# Patient Record
Sex: FEMALE | Race: BLACK OR AFRICAN AMERICAN | Employment: UNEMPLOYED | URBAN - METROPOLITAN AREA
[De-identification: names, ages, dates, MRNs, and addresses within clinical notes are randomized per-mention and may not be internally consistent; named-entity substitution may affect disease eponyms.]

---
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
## 2021-09-10 ENCOUNTER — HOSPITAL ENCOUNTER (OUTPATIENT)
Age: 21
Discharge: HOME | End: 2021-09-10
Payer: MEDICARE

## 2021-09-10 ENCOUNTER — HOSPITAL ENCOUNTER (OUTPATIENT)
Age: 21
Discharge: HOME | End: 2021-09-12
Payer: MEDICARE

## 2021-09-10 ENCOUNTER — HOSPITAL ENCOUNTER (OUTPATIENT)
Dept: GENERAL RADIOLOGY | Age: 21
Discharge: HOME | End: 2021-09-12
Payer: MEDICARE

## 2021-09-10 DIAGNOSIS — R63.4 LOSS OF WEIGHT: ICD-10-CM

## 2021-09-10 LAB
-: ABNORMAL
ABSOLUTE EOS #: 0.14 K/UL (ref 0–0.44)
ABSOLUTE IMMATURE GRANULOCYTE: <0.03 K/UL (ref 0–0.3)
ABSOLUTE LYMPH #: 1.74 K/UL (ref 1.2–5.2)
ABSOLUTE MONO #: 0.39 K/UL (ref 0.1–1.4)
ALBUMIN SERPL-MCNC: 4.3 G/DL (ref 3.5–5.2)
ALBUMIN/GLOBULIN RATIO: 1.4 (ref 1–2.5)
ALP BLD-CCNC: 60 U/L (ref 35–104)
ALT SERPL-CCNC: 8 U/L (ref 5–33)
AMORPHOUS: ABNORMAL
ANION GAP SERPL CALCULATED.3IONS-SCNC: 11 MMOL/L (ref 9–17)
AST SERPL-CCNC: 10 U/L
BACTERIA: ABNORMAL
BASOPHILS # BLD: 1 % (ref 0–2)
BASOPHILS ABSOLUTE: 0.04 K/UL (ref 0–0.2)
BILIRUB SERPL-MCNC: 0.49 MG/DL (ref 0.3–1.2)
BILIRUBIN URINE: ABNORMAL
BUN BLDV-MCNC: 6 MG/DL (ref 6–20)
BUN/CREAT BLD: ABNORMAL (ref 9–20)
CALCIUM SERPL-MCNC: 9.2 MG/DL (ref 8.6–10.4)
CASTS UA: ABNORMAL /LPF (ref 0–2)
CHLORIDE BLD-SCNC: 105 MMOL/L (ref 98–107)
CHOLESTEROL/HDL RATIO: 2.4
CHOLESTEROL: 125 MG/DL
CO2: 23 MMOL/L (ref 20–31)
COLOR: ABNORMAL
COMMENT UA: ABNORMAL
CREAT SERPL-MCNC: 0.76 MG/DL (ref 0.5–0.9)
CRYSTALS, UA: ABNORMAL /HPF
DIFFERENTIAL TYPE: NORMAL
EOSINOPHILS RELATIVE PERCENT: 2 % (ref 1–4)
EPITHELIAL CELLS UA: ABNORMAL /HPF (ref 0–5)
ESTIMATED AVERAGE GLUCOSE: 108 MG/DL
GFR AFRICAN AMERICAN: >60 ML/MIN
GFR NON-AFRICAN AMERICAN: >60 ML/MIN
GFR SERPL CREATININE-BSD FRML MDRD: ABNORMAL ML/MIN/{1.73_M2}
GFR SERPL CREATININE-BSD FRML MDRD: ABNORMAL ML/MIN/{1.73_M2}
GLUCOSE BLD-MCNC: 103 MG/DL (ref 70–99)
GLUCOSE URINE: NEGATIVE
HBA1C MFR BLD: 5.4 % (ref 4–6)
HCT VFR BLD CALC: 44.1 % (ref 36.3–47.1)
HDLC SERPL-MCNC: 52 MG/DL
HEMOGLOBIN: 14.3 G/DL (ref 11.9–15.1)
IMMATURE GRANULOCYTES: 0 %
KETONES, URINE: ABNORMAL
LDL CHOLESTEROL: 62 MG/DL (ref 0–130)
LEUKOCYTE ESTERASE, URINE: ABNORMAL
LYMPHOCYTES # BLD: 30 % (ref 25–45)
MCH RBC QN AUTO: 30.1 PG (ref 25.2–33.5)
MCHC RBC AUTO-ENTMCNC: 32.4 G/DL (ref 28.4–34.8)
MCV RBC AUTO: 92.8 FL (ref 82.6–102.9)
MONOCYTES # BLD: 7 % (ref 2–8)
MUCUS: ABNORMAL
NITRITE, URINE: NEGATIVE
NRBC AUTOMATED: 0 PER 100 WBC
OTHER OBSERVATIONS UA: ABNORMAL
PDW BLD-RTO: 11.9 % (ref 11.8–14.4)
PH UA: 6 (ref 5–8)
PLATELET # BLD: 278 K/UL (ref 138–453)
PLATELET ESTIMATE: NORMAL
PMV BLD AUTO: 9.8 FL (ref 8.1–13.5)
POTASSIUM SERPL-SCNC: 3.6 MMOL/L (ref 3.7–5.3)
PROTEIN UA: ABNORMAL
RBC # BLD: 4.75 M/UL (ref 3.95–5.11)
RBC # BLD: NORMAL 10*6/UL
RBC UA: ABNORMAL /HPF (ref 0–2)
RENAL EPITHELIAL, UA: ABNORMAL /HPF
SEG NEUTROPHILS: 60 % (ref 34–64)
SEGMENTED NEUTROPHILS ABSOLUTE COUNT: 3.5 K/UL (ref 1.8–8)
SODIUM BLD-SCNC: 139 MMOL/L (ref 135–144)
SPECIFIC GRAVITY UA: 1.03 (ref 1–1.03)
TOTAL PROTEIN: 7.3 G/DL (ref 6.4–8.3)
TRICHOMONAS: ABNORMAL
TRIGL SERPL-MCNC: 54 MG/DL
TSH SERPL DL<=0.05 MIU/L-ACNC: 1.51 MIU/L (ref 0.3–5)
TURBIDITY: ABNORMAL
URINE HGB: NEGATIVE
UROBILINOGEN, URINE: NORMAL
VLDLC SERPL CALC-MCNC: NORMAL MG/DL (ref 1–30)
WBC # BLD: 5.8 K/UL (ref 4.5–13.5)
WBC # BLD: NORMAL 10*3/UL
WBC UA: ABNORMAL /HPF (ref 0–5)
YEAST: ABNORMAL

## 2021-09-10 PROCEDURE — 84443 ASSAY THYROID STIM HORMONE: CPT

## 2021-09-10 PROCEDURE — 81001 URINALYSIS AUTO W/SCOPE: CPT

## 2021-09-10 PROCEDURE — 80061 LIPID PANEL: CPT

## 2021-09-10 PROCEDURE — 80053 COMPREHEN METABOLIC PANEL: CPT

## 2021-09-10 PROCEDURE — 36415 COLL VENOUS BLD VENIPUNCTURE: CPT

## 2021-09-10 PROCEDURE — 83036 HEMOGLOBIN GLYCOSYLATED A1C: CPT

## 2021-09-10 PROCEDURE — 71046 X-RAY EXAM CHEST 2 VIEWS: CPT

## 2021-09-10 PROCEDURE — 85025 COMPLETE CBC W/AUTO DIFF WBC: CPT

## 2023-08-18 ENCOUNTER — HOSPITAL ENCOUNTER (EMERGENCY)
Age: 23
Discharge: HOME | End: 2023-08-18
Attending: EMERGENCY MEDICINE
Payer: MEDICAID

## 2023-08-18 VITALS
TEMPERATURE: 97.9 F | SYSTOLIC BLOOD PRESSURE: 101 MMHG | WEIGHT: 115 LBS | DIASTOLIC BLOOD PRESSURE: 68 MMHG | BODY MASS INDEX: 18.05 KG/M2 | HEART RATE: 78 BPM | HEIGHT: 67 IN | RESPIRATION RATE: 16 BRPM | OXYGEN SATURATION: 100 %

## 2023-08-18 DIAGNOSIS — K08.89 PAIN, DENTAL: Primary | ICD-10-CM

## 2023-08-18 PROCEDURE — 99283 EMERGENCY DEPT VISIT LOW MDM: CPT

## 2023-08-18 PROCEDURE — 6370000000 HC RX 637 (ALT 250 FOR IP)

## 2023-08-18 RX ORDER — ACETAMINOPHEN 325 MG/1
650 TABLET ORAL EVERY 6 HOURS PRN
Qty: 28 TABLET | Refills: 0 | Status: SHIPPED | OUTPATIENT
Start: 2023-08-18 | End: 2023-08-25

## 2023-08-18 RX ORDER — PENICILLIN V POTASSIUM 500 MG/1
500 TABLET ORAL 4 TIMES DAILY
Qty: 27 TABLET | Refills: 0 | Status: SHIPPED | OUTPATIENT
Start: 2023-08-18 | End: 2023-08-25

## 2023-08-18 RX ORDER — ACETAMINOPHEN 500 MG
1000 TABLET ORAL ONCE
Status: COMPLETED | OUTPATIENT
Start: 2023-08-18 | End: 2023-08-18

## 2023-08-18 RX ORDER — IBUPROFEN 400 MG/1
400 TABLET ORAL EVERY 6 HOURS PRN
Qty: 28 TABLET | Refills: 0 | Status: SHIPPED | OUTPATIENT
Start: 2023-08-18 | End: 2023-08-25

## 2023-08-18 RX ORDER — PENICILLIN V POTASSIUM 250 MG/1
500 TABLET ORAL ONCE
Status: COMPLETED | OUTPATIENT
Start: 2023-08-18 | End: 2023-08-18

## 2023-08-18 RX ADMIN — BENZOCAINE: 220 GEL, DENTIFRICE DENTAL at 09:58

## 2023-08-18 RX ADMIN — ACETAMINOPHEN 1000 MG: 500 TABLET ORAL at 09:58

## 2023-08-18 RX ADMIN — PENICILLIN V POTASSIUM 500 MG: 250 TABLET ORAL at 09:58

## 2023-08-18 ASSESSMENT — PAIN SCALES - GENERAL: PAINLEVEL_OUTOF10: 7

## 2023-08-18 ASSESSMENT — PAIN DESCRIPTION - LOCATION: LOCATION: MOUTH

## 2023-08-18 ASSESSMENT — PAIN DESCRIPTION - DESCRIPTORS: DESCRIPTORS: ACHING;DISCOMFORT

## 2023-08-18 ASSESSMENT — PAIN - FUNCTIONAL ASSESSMENT: PAIN_FUNCTIONAL_ASSESSMENT: 0-10

## 2023-11-29 ENCOUNTER — HOSPITAL ENCOUNTER (EMERGENCY)
Age: 23
Discharge: HOME | End: 2023-11-29
Attending: EMERGENCY MEDICINE
Payer: MEDICAID

## 2023-11-29 VITALS
HEIGHT: 65 IN | SYSTOLIC BLOOD PRESSURE: 97 MMHG | DIASTOLIC BLOOD PRESSURE: 59 MMHG | OXYGEN SATURATION: 97 % | WEIGHT: 115.3 LBS | HEART RATE: 59 BPM | BODY MASS INDEX: 19.21 KG/M2 | RESPIRATION RATE: 18 BRPM | TEMPERATURE: 97.5 F

## 2023-11-29 DIAGNOSIS — A49.8 GARDNERELLA INFECTION: Primary | ICD-10-CM

## 2023-11-29 LAB
BACTERIA URNS QL MICRO: ABNORMAL
BILIRUB UR QL STRIP: NEGATIVE
CANDIDA SPECIES: NEGATIVE
CASTS #/AREA URNS LPF: ABNORMAL /LPF (ref 0–8)
CLARITY UR: CLEAR
COLOR UR: YELLOW
EPI CELLS #/AREA URNS HPF: ABNORMAL /HPF (ref 0–5)
GARDNERELLA VAGINALIS: POSITIVE
GLUCOSE UR STRIP-MCNC: NEGATIVE MG/DL
HCG UR QL: NEGATIVE
HGB UR QL STRIP.AUTO: NEGATIVE
KETONES UR STRIP-MCNC: ABNORMAL MG/DL
LEUKOCYTE ESTERASE UR QL STRIP: NEGATIVE
NITRITE UR QL STRIP: NEGATIVE
PH UR STRIP: 6.5 [PH] (ref 5–8)
PROT UR STRIP-MCNC: NEGATIVE MG/DL
RBC #/AREA URNS HPF: ABNORMAL /HPF (ref 0–4)
SOURCE: ABNORMAL
SP GR UR STRIP: 1.03 (ref 1–1.03)
TRICHOMONAS: NEGATIVE
UROBILINOGEN UR STRIP-ACNC: NORMAL EU/DL (ref 0–1)
WBC #/AREA URNS HPF: ABNORMAL /HPF (ref 0–5)

## 2023-11-29 PROCEDURE — 87480 CANDIDA DNA DIR PROBE: CPT

## 2023-11-29 PROCEDURE — 81025 URINE PREGNANCY TEST: CPT

## 2023-11-29 PROCEDURE — 81001 URINALYSIS AUTO W/SCOPE: CPT

## 2023-11-29 PROCEDURE — 87660 TRICHOMONAS VAGIN DIR PROBE: CPT

## 2023-11-29 PROCEDURE — 99283 EMERGENCY DEPT VISIT LOW MDM: CPT

## 2023-11-29 PROCEDURE — 87591 N.GONORRHOEAE DNA AMP PROB: CPT

## 2023-11-29 PROCEDURE — 87491 CHLMYD TRACH DNA AMP PROBE: CPT

## 2023-11-29 PROCEDURE — 87510 GARDNER VAG DNA DIR PROBE: CPT

## 2023-11-29 PROCEDURE — 6370000000 HC RX 637 (ALT 250 FOR IP)

## 2023-11-29 RX ORDER — IBUPROFEN 400 MG/1
400 TABLET ORAL EVERY 6 HOURS PRN
Qty: 20 TABLET | Refills: 0 | Status: SHIPPED | OUTPATIENT
Start: 2023-11-29 | End: 2023-12-04

## 2023-11-29 RX ORDER — METRONIDAZOLE 500 MG/1
500 TABLET ORAL 2 TIMES DAILY
Qty: 14 TABLET | Refills: 0 | Status: SHIPPED | OUTPATIENT
Start: 2023-11-29 | End: 2023-12-06

## 2023-11-29 RX ORDER — IBUPROFEN 400 MG/1
400 TABLET ORAL ONCE
Status: COMPLETED | OUTPATIENT
Start: 2023-11-29 | End: 2023-11-29

## 2023-11-29 RX ADMIN — IBUPROFEN 400 MG: 400 TABLET, FILM COATED ORAL at 22:17

## 2023-11-29 ASSESSMENT — ENCOUNTER SYMPTOMS
SHORTNESS OF BREATH: 0
VOMITING: 0
COLOR CHANGE: 0
CHEST TIGHTNESS: 0
CONSTIPATION: 0
NAUSEA: 1

## 2023-11-30 LAB
C TRACH DNA SPEC QL PROBE+SIG AMP: NEGATIVE
N GONORRHOEA DNA SPEC QL PROBE+SIG AMP: NEGATIVE
SPECIMEN DESCRIPTION: NORMAL

## 2023-11-30 NOTE — ED NOTES
Resident performed pelvic exam with Leighton Canales RN as chaperone. Specimen obtained and sent to lab. Pt tolerated procedure well.          Caterina Whiting RN  11/29/23 2229

## 2023-11-30 NOTE — ED PROVIDER NOTES
[facility] ED  Emergency Department Encounter  Emergency Medicine Resident     Pt Janneth Martin  MRN: [id number]  [address] [facility] 2000  Date of evaluation: 11/29/23  PCP:  NASREEN Pulido NP  Note Started: 10:28 PM EST      CHIEF COMPLAINT       Chief Complaint   Patient presents with    Abdominal Cramping     Pt states she woke up to abd cramping for the past 2-3 days       HISTORY OF PRESENT ILLNESS  (Location/Symptom, Timing/Onset, Context/Setting, Quality, Duration, Modifying Factors, Severity.)      Batsheva Diaz is a 26 y.o. female who presents with abdominal cramping for the last 4 days. Patient states that she woke up with cramping in the lower abdomen since Saturday morning. Patient states it is 8 out of 10 intensity, cramping type, aggravated by eating sometimes, associate with nausea. Patient denies any fever, vomiting, constipation, diarrhea, dysuria, increased frequency of urination urgency, chest pain, shortness of breath, vaginal bleeding or discharge. Patient states her last menstrual period was 2 weeks ago. Patient reports she never had a similar episodes in the past before. Patient has not tried any medications at home. PAST MEDICAL / SURGICAL / SOCIAL / FAMILY HISTORY      has a past medical history of Asthma and Fibroadenoma. has a past surgical history that includes Breast fibroadenoma surgery and skin biopsy (Left, 8/20/2019).       Social History     Socioeconomic History    Marital status: Single     Spouse name: Not on file    Number of children: Not on file    Years of education: Not on file    Highest education level: Not on file   Occupational History    Not on file   Tobacco Use    Smoking status: Never    Smokeless tobacco: Never   Vaping Use    Vaping Use: Never used   Substance and Sexual Activity    Alcohol use: No    Drug use: No    Sexual activity: Not on file   Other Topics Concern    Not on file   Social History Narrative    Not

## 2023-11-30 NOTE — DISCHARGE INSTRUCTIONS
You are evaluated in the emergency department for your abdominal pain. Your urine tests were negative for any infection. You tested positive for Gardnerella vaginalis from your pelvic exam.  You are prescribed metronidazole 500 Mg twice daily for 7 days. Please complete the course of antibiotics as prescribed. You may take Motrin 400 Mg every 6 hours as needed for pain/cramping. Your results for chlamydia and gonorrhea are still pending. You are advised to follow-up with your PCP for pending results and further management as needed. If you develop any severe abdominal pain, vomiting, dizziness, fever, any other severe symptoms please call 911 or visit the nearest emergency department.

## 2023-11-30 NOTE — ED NOTES
The following labs were labeled with appropriate pt sticker and tubed to lab:     [] Blue     [] Lavender   [] on ice  [] Green/yellow  [] Green/black [] on ice  [] Joen Horacio  [] on ice  [] Yellow  [] Red  [] Pink  [] Type/ Screen  [] ABG  [] VBG    [] COVID-19 swab    [] Rapid  [] PCR  [] Flu swab  [] Peds Viral Panel     [] Urine Sample  [] Fecal Sample  [x] Pelvic Cultures  [] Blood Cultures  [] X 2  [] STREP Cultures  [] Wound Cultures       January Christianson RN  11/29/23 0360

## 2023-11-30 NOTE — ED NOTES
Pt arrived to ED with c/o  having abdominal cramping x 4 days  Pt states that the pain comes and goes, when she has the pain it is a stabbing sensation across her lower abdomen   Pt states states he has some nausea but no vomiting   Pt denies having any urinary or bowel issues  Pt states that she has not taken anything at home for the pain   Breathing is non labored and no acute distress is noted. Pt resting on stretcher, call light within reach. white board updated        Fior Hancock RN  11/29/23 212

## 2025-01-13 ENCOUNTER — APPOINTMENT (OUTPATIENT)
Dept: ULTRASOUND IMAGING | Age: 25
End: 2025-01-13
Payer: MEDICAID

## 2025-01-13 ENCOUNTER — HOSPITAL ENCOUNTER (EMERGENCY)
Age: 25
Discharge: HOME | End: 2025-01-13
Attending: EMERGENCY MEDICINE
Payer: MEDICAID

## 2025-01-13 VITALS
RESPIRATION RATE: 18 BRPM | TEMPERATURE: 97.2 F | SYSTOLIC BLOOD PRESSURE: 126 MMHG | OXYGEN SATURATION: 100 % | HEART RATE: 88 BPM | DIASTOLIC BLOOD PRESSURE: 76 MMHG

## 2025-01-13 DIAGNOSIS — Z34.90 EARLY STAGE OF PREGNANCY: ICD-10-CM

## 2025-01-13 DIAGNOSIS — O99.891 ASYMPTOMATIC BACTERIURIA DURING PREGNANCY: ICD-10-CM

## 2025-01-13 DIAGNOSIS — R82.71 ASYMPTOMATIC BACTERIURIA DURING PREGNANCY: ICD-10-CM

## 2025-01-13 DIAGNOSIS — O23.599 BACTERIAL VAGINOSIS IN PREGNANCY: Primary | ICD-10-CM

## 2025-01-13 DIAGNOSIS — B96.89 BACTERIAL VAGINOSIS IN PREGNANCY: Primary | ICD-10-CM

## 2025-01-13 LAB
ALBUMIN SERPL-MCNC: 4.4 G/DL (ref 3.5–5.2)
ALBUMIN/GLOB SERPL: 1.5 {RATIO} (ref 1–2.5)
ALP SERPL-CCNC: 44 U/L (ref 35–104)
ALT SERPL-CCNC: 8 U/L (ref 10–35)
ANION GAP SERPL CALCULATED.3IONS-SCNC: 11 MMOL/L (ref 9–16)
AST SERPL-CCNC: 15 U/L (ref 10–35)
B-HCG SERPL EIA 3RD IS-ACNC: NORMAL MIU/ML
BACTERIA URNS QL MICRO: ABNORMAL
BASOPHILS # BLD: 0.04 K/UL (ref 0–0.2)
BASOPHILS NFR BLD: 1 % (ref 0–2)
BILIRUB SERPL-MCNC: 0.5 MG/DL (ref 0–1.2)
BILIRUB UR QL STRIP: NEGATIVE
BUN SERPL-MCNC: 9 MG/DL (ref 6–20)
CALCIUM SERPL-MCNC: 9.3 MG/DL (ref 8.6–10.4)
CANDIDA SPECIES: NEGATIVE
CASTS #/AREA URNS LPF: ABNORMAL /LPF (ref 0–8)
CHLORIDE SERPL-SCNC: 101 MMOL/L (ref 98–107)
CLARITY UR: ABNORMAL
CO2 SERPL-SCNC: 22 MMOL/L (ref 20–31)
COLOR UR: YELLOW
CREAT SERPL-MCNC: 0.8 MG/DL (ref 0.6–0.9)
EOSINOPHIL # BLD: 0.09 K/UL (ref 0–0.44)
EOSINOPHILS RELATIVE PERCENT: 1 % (ref 1–4)
EPI CELLS #/AREA URNS HPF: ABNORMAL /HPF (ref 0–5)
ERYTHROCYTE [DISTWIDTH] IN BLOOD BY AUTOMATED COUNT: 11.7 % (ref 11.8–14.4)
GARDNERELLA VAGINALIS: POSITIVE
GFR, ESTIMATED: >90 ML/MIN/1.73M2
GLUCOSE SERPL-MCNC: 98 MG/DL (ref 74–99)
GLUCOSE UR STRIP-MCNC: NEGATIVE MG/DL
HCG SERPL QL: POSITIVE
HCT VFR BLD AUTO: 41.3 % (ref 36.3–47.1)
HGB BLD-MCNC: 14 G/DL (ref 11.9–15.1)
HGB UR QL STRIP.AUTO: NEGATIVE
IMM GRANULOCYTES # BLD AUTO: <0.03 K/UL (ref 0–0.3)
IMM GRANULOCYTES NFR BLD: 0 %
KETONES UR STRIP-MCNC: NEGATIVE MG/DL
LEUKOCYTE ESTERASE UR QL STRIP: ABNORMAL
LYMPHOCYTES NFR BLD: 3.16 K/UL (ref 1.1–3.7)
LYMPHOCYTES RELATIVE PERCENT: 48 % (ref 24–43)
MCH RBC QN AUTO: 30.1 PG (ref 25.2–33.5)
MCHC RBC AUTO-ENTMCNC: 33.9 G/DL (ref 28.4–34.8)
MCV RBC AUTO: 88.8 FL (ref 82.6–102.9)
MONOCYTES NFR BLD: 0.64 K/UL (ref 0.1–1.2)
MONOCYTES NFR BLD: 10 % (ref 3–12)
NEUTROPHILS NFR BLD: 40 % (ref 36–65)
NEUTS SEG NFR BLD: 2.68 K/UL (ref 1.5–8.1)
NITRITE UR QL STRIP: NEGATIVE
NRBC BLD-RTO: 0 PER 100 WBC
PH UR STRIP: 7.5 [PH] (ref 5–8)
PLATELET # BLD AUTO: 234 K/UL (ref 138–453)
PMV BLD AUTO: 9.7 FL (ref 8.1–13.5)
POTASSIUM SERPL-SCNC: 4.2 MMOL/L (ref 3.7–5.3)
PROT SERPL-MCNC: 7.3 G/DL (ref 6.6–8.7)
PROT UR STRIP-MCNC: NEGATIVE MG/DL
RBC # BLD AUTO: 4.65 M/UL (ref 3.95–5.11)
RBC #/AREA URNS HPF: ABNORMAL /HPF (ref 0–4)
SODIUM SERPL-SCNC: 134 MMOL/L (ref 136–145)
SOURCE: ABNORMAL
SP GR UR STRIP: 1.02 (ref 1–1.03)
TRICHOMONAS: NEGATIVE
UROBILINOGEN UR STRIP-ACNC: NORMAL EU/DL (ref 0–1)
WBC #/AREA URNS HPF: ABNORMAL /HPF (ref 0–5)
WBC OTHER # BLD: 6.6 K/UL (ref 3.5–11.3)

## 2025-01-13 PROCEDURE — 81001 URINALYSIS AUTO W/SCOPE: CPT

## 2025-01-13 PROCEDURE — 99284 EMERGENCY DEPT VISIT MOD MDM: CPT

## 2025-01-13 PROCEDURE — 84703 CHORIONIC GONADOTROPIN ASSAY: CPT

## 2025-01-13 PROCEDURE — 76817 TRANSVAGINAL US OBSTETRIC: CPT

## 2025-01-13 PROCEDURE — 93005 ELECTROCARDIOGRAM TRACING: CPT | Performed by: EMERGENCY MEDICINE

## 2025-01-13 PROCEDURE — 6370000000 HC RX 637 (ALT 250 FOR IP)

## 2025-01-13 PROCEDURE — 93976 VASCULAR STUDY: CPT

## 2025-01-13 PROCEDURE — 80053 COMPREHEN METABOLIC PANEL: CPT

## 2025-01-13 PROCEDURE — 85025 COMPLETE CBC W/AUTO DIFF WBC: CPT

## 2025-01-13 PROCEDURE — 87660 TRICHOMONAS VAGIN DIR PROBE: CPT

## 2025-01-13 PROCEDURE — 87480 CANDIDA DNA DIR PROBE: CPT

## 2025-01-13 PROCEDURE — 87510 GARDNER VAG DNA DIR PROBE: CPT

## 2025-01-13 PROCEDURE — 87591 N.GONORRHOEAE DNA AMP PROB: CPT

## 2025-01-13 PROCEDURE — 6370000000 HC RX 637 (ALT 250 FOR IP): Performed by: EMERGENCY MEDICINE

## 2025-01-13 PROCEDURE — 87086 URINE CULTURE/COLONY COUNT: CPT

## 2025-01-13 PROCEDURE — 84702 CHORIONIC GONADOTROPIN TEST: CPT

## 2025-01-13 PROCEDURE — 87491 CHLMYD TRACH DNA AMP PROBE: CPT

## 2025-01-13 RX ORDER — METRONIDAZOLE 500 MG/1
500 TABLET ORAL ONCE
Status: COMPLETED | OUTPATIENT
Start: 2025-01-13 | End: 2025-01-13

## 2025-01-13 RX ORDER — ONDANSETRON 4 MG/1
4 TABLET, ORALLY DISINTEGRATING ORAL ONCE
Status: COMPLETED | OUTPATIENT
Start: 2025-01-13 | End: 2025-01-13

## 2025-01-13 RX ORDER — METRONIDAZOLE 500 MG/1
500 TABLET ORAL 2 TIMES DAILY
Qty: 14 TABLET | Refills: 0 | Status: SHIPPED | OUTPATIENT
Start: 2025-01-13 | End: 2025-01-20

## 2025-01-13 RX ORDER — CEPHALEXIN 500 MG/1
500 CAPSULE ORAL 2 TIMES DAILY
Qty: 14 CAPSULE | Refills: 0 | Status: SHIPPED | OUTPATIENT
Start: 2025-01-13 | End: 2025-01-20

## 2025-01-13 RX ORDER — CEPHALEXIN 500 MG/1
500 CAPSULE ORAL ONCE
Status: COMPLETED | OUTPATIENT
Start: 2025-01-13 | End: 2025-01-13

## 2025-01-13 RX ADMIN — ONDANSETRON 4 MG: 4 TABLET, ORALLY DISINTEGRATING ORAL at 13:36

## 2025-01-13 RX ADMIN — METRONIDAZOLE 500 MG: 500 TABLET ORAL at 15:47

## 2025-01-13 RX ADMIN — CEPHALEXIN 500 MG: 500 CAPSULE ORAL at 13:36

## 2025-01-13 ASSESSMENT — PAIN DESCRIPTION - LOCATION: LOCATION: ABDOMEN

## 2025-01-13 ASSESSMENT — PAIN - FUNCTIONAL ASSESSMENT: PAIN_FUNCTIONAL_ASSESSMENT: 0-10

## 2025-01-13 ASSESSMENT — PAIN SCALES - GENERAL: PAINLEVEL_OUTOF10: 3

## 2025-01-13 ASSESSMENT — ENCOUNTER SYMPTOMS
COUGH: 0
SHORTNESS OF BREATH: 0
ABDOMINAL PAIN: 0

## 2025-01-13 NOTE — DISCHARGE INSTRUCTIONS
Call today or tomorrow to follow up with Sruthi Mcbride APRN - NP  in 2 days.    Your urine showed signs of bacteria in the urine.  UTIs can increase your risk for miscarriages.  Please take your prescribed Keflex as prescribed.  The pelvic swabs also showed bacterial vaginosis with a overgrowth of bacteria in your vaginal canal.  Please take your metronidazole as prescribed.    Take your medication as prescribed.  Avoid drinking alcohol or drinks that have caffeine it.  Drink plenty of water or fluids like Gatorade.    Return to the Emergency Department for worsening of nausea or vomiting, fever > 101.5, abdominal pain, blood in stool, vomiting blood, unable to tolerate oral fluids, continue to have vomiting for more than 24 hours, any other care or concern.

## 2025-01-13 NOTE — H&P
Obstetric/Gynecology Resident Telephone Consultation Note     Patient case discussed with ED Resident. Patient NOT evaluated by OBGYN team.       Per ED resident, patient presented to [facility] ED reporting lightheadedness and abdominal cramping. Patient noted to have stable vitals. Patient's labs revealed Hgb 14.0, WBC 6.6, CMP unremarkable, hCG +, quant 19,004. Pelvic examination performed by ED resident, noted to be unremarkable. Vaginitis +BV. GC/C pending. Urinalysis low suspicion for UTI. TVUS obtained and revealed yolk + gestational sac without fetal pole. Single intraluminal gestational sac, currently containing a yolk sac, though no identifiable fetal pole.  This could represent an early normal  pregnancy, with being too early to detect a fetal pole at this time.  This could also represent a blighted ovum. Per ED resident, patient is stable for discharge.     Vitals:    01/13/25 1222   BP: 126/76   Pulse: 88   Resp: 18   Temp: 97.2 °F (36.2 °C)   TempSrc: Oral   SpO2: 100%     Recent Results (from the past 24 hour(s))   CBC with Auto Differential    Collection Time: 01/13/25 12:38 PM   Result Value Ref Range    WBC 6.6 3.5 - 11.3 k/uL    RBC 4.65 3.95 - 5.11 m/uL    Hemoglobin 14.0 11.9 - 15.1 g/dL    Hematocrit 41.3 36.3 - 47.1 %    MCV 88.8 82.6 - 102.9 fL    MCH 30.1 25.2 - 33.5 pg    MCHC 33.9 28.4 - 34.8 g/dL    RDW 11.7 (L) 11.8 - 14.4 %    Platelets 234 138 - 453 k/uL    MPV 9.7 8.1 - 13.5 fL    NRBC Automated 0.0 0.0 per 100 WBC    Neutrophils % 40 36 - 65 %    Lymphocytes % 48 (H) 24 - 43 %    Monocytes % 10 3 - 12 %    Eosinophils % 1 1 - 4 %    Basophils % 1 0 - 2 %    Immature Granulocytes % 0 0 %    Neutrophils Absolute 2.68 1.50 - 8.10 k/uL    Lymphocytes Absolute 3.16 1.10 - 3.70 k/uL    Monocytes Absolute 0.64 0.10 - 1.20 k/uL    Eosinophils Absolute 0.09 0.00 - 0.44 k/uL    Basophils Absolute 0.04 0.00 - 0.20 k/uL    Immature Granulocytes Absolute <0.03 0.00 - 0.30 k/uL   Comprehensive

## 2025-01-13 NOTE — ED PROVIDER NOTES
documented in ED Course.  Radiology: ordered.    Risk  Prescription drug management.        June Javier MD   Attending Emergency Physician    (Please note that portions of this note were completed with a voice recognition program. Efforts were made to edit the dictations but occasionally words are mis-transcribed.)            June Javier MD  01/13/25 1400    
is normal. No respiratory distress.      Breath sounds: Normal breath sounds. No wheezing.   Abdominal:      General: There is no distension.      Palpations: Abdomen is soft.      Tenderness: There is no abdominal tenderness. There is no right CVA tenderness, left CVA tenderness, guarding or rebound.   Genitourinary:     Labia:         Right: No rash, tenderness or lesion.         Left: No rash, tenderness or lesion.       Comments: Chaperone: Henrietta Vásquez RN       Cervical os closed  No discharge from the cervical os  Minimal thin whitish discharge within the vaginal vault.  No adnexal tenderness bilaterally, no CMT.  Musculoskeletal:         General: No tenderness. Normal range of motion.   Skin:     General: Skin is warm and dry.      Findings: No rash.   Neurological:      Mental Status: She is alert and oriented to person, place, and time.      Motor: No abnormal muscle tone.      Coordination: Coordination normal.           DDX/DIAGNOSTIC RESULTS / EMERGENCY DEPARTMENT COURSE / MDM     Medical Decision Making  34-year-old female presents to the ER with abdominal cramping.  Has had 3 positive pregnancy tests at home.  Also Trezix nausea and vomiting.  Had similar nausea and vomiting in her previous pregnancy.  LMP 12//2024.  No vaginal discharge or bleeding.  No UTI symptoms either.  Still able to tolerate orally.  Per patient no previous complications with her previous pregnancy.    On exam, patient is afebrile, vitally stable, abdomen is soft nontender, chest is clear bilaterally, pelvic exam is unremarkable    Differential diagnosis: Early pregnancy, rule out ectopic    Plan: CBC, CMP, UA, hCG qualitative, hCG quantitative, transvaginal ultrasound.    Transvaginal ultrasound revealed a gestational sac with a yolk sac however no fetal pole.  This could be secondary to an early pregnancy, will discuss with OB for outpatient follow-up.  OB has ordered a repeat hCG quant as well as a repeat transvaginal

## 2025-01-13 NOTE — ED NOTES
Pt presented to ED ambulatory from triage.  Pt presents with C/O abdominal cramping and dizziness.  Pt states she is 5-7 weeks pregnant and had these symptoms for over a week.  Pt denies any other C/O.  Pt is Alert and oriented. Pt is resting comfortably on stretcher with call light in reach.  No acute distress noted. Respirations are even and unlabored.  White board updated. Will continue to follow plan of care.

## 2025-01-14 LAB
C TRACH DNA SPEC QL PROBE+SIG AMP: NEGATIVE
EKG ATRIAL RATE: 80 BPM
EKG P AXIS: 83 DEGREES
EKG P-R INTERVAL: 130 MS
EKG Q-T INTERVAL: 392 MS
EKG QRS DURATION: 90 MS
EKG QTC CALCULATION (BAZETT): 452 MS
EKG R AXIS: 93 DEGREES
EKG T AXIS: 74 DEGREES
EKG VENTRICULAR RATE: 80 BPM
MICROORGANISM SPEC CULT: NORMAL
N GONORRHOEA DNA SPEC QL PROBE+SIG AMP: NEGATIVE
SERVICE CMNT-IMP: NORMAL
SPECIMEN DESCRIPTION: NORMAL
SPECIMEN DESCRIPTION: NORMAL

## 2025-02-04 ENCOUNTER — TELEPHONE (OUTPATIENT)
Dept: OBGYN | Age: 25
End: 2025-02-04

## 2025-02-04 NOTE — TELEPHONE ENCOUNTER
Patient mom is requesting a call to patient  regarding obtaining a New Patient OB appointment and can be reached at [phone] . Okay to leave a message.

## 2025-02-04 NOTE — TELEPHONE ENCOUNTER
Called and spoke with patient and [facility]  She states she will have this drawn and we will call her once we receive the results

## 2025-04-03 ENCOUNTER — ROUTINE PRENATAL (OUTPATIENT)
Dept: PERINATAL CARE | Age: 25
End: 2025-04-03
Payer: MEDICAID

## 2025-04-03 VITALS
WEIGHT: 130 LBS | HEART RATE: 84 BPM | RESPIRATION RATE: 16 BRPM | TEMPERATURE: 98.6 F | BODY MASS INDEX: 21.66 KG/M2 | SYSTOLIC BLOOD PRESSURE: 112 MMHG | DIASTOLIC BLOOD PRESSURE: 64 MMHG | HEIGHT: 65 IN

## 2025-04-03 DIAGNOSIS — Z34.90 SECOND PREGNANCY: Primary | ICD-10-CM

## 2025-04-03 DIAGNOSIS — Z3A.17 17 WEEKS GESTATION OF PREGNANCY: ICD-10-CM

## 2025-04-03 PROCEDURE — 76805 OB US >/= 14 WKS SNGL FETUS: CPT | Performed by: OBSTETRICS & GYNECOLOGY

## 2025-04-03 PROCEDURE — 99999 PR OFFICE/OUTPT VISIT,PROCEDURE ONLY: CPT | Performed by: OBSTETRICS & GYNECOLOGY

## 2025-04-03 NOTE — PROGRESS NOTES
[facility] MATERNAL FETAL MED  [address]  [place] [address]  Dept: [phone]     4/3/2025    RE:  Wander Urbina     : 2000   AGE: 25 y.o.     Dear Dr. Astudillo,    Thank you for allowing me to see Wander Urbina.  As I'm sure you will recall, Wander Urbina is a 25 y.o. [id number]'s last menstrual period was 2024 (exact date). Estimated Date of Delivery: 9/10/25 at 17w1d seen in our office today for the following:    REASON FOR VISIT: Greater than 14-week    Patient Active Problem List    Diagnosis Date Noted    Second pregnancy 2025        PAST HISTORY:  OB History    Para Term  AB Living   1        SAB IAB Ectopic Molar Multiple Live Births              # Outcome Date GA Lbr Lance/2nd Weight Sex Type Anes PTL Lv   1 Current                   MEDICAL:  Past Medical History:   Diagnosis Date    Asthma     Fibroadenoma         SURGICAL:  Past Surgical History:   Procedure Laterality Date    BREAST FIBROADENOMA SURGERY      SKIN BIOPSY Left 2019    EXCISIONAL BIOPSY MASS THIGH performed by Goldy Liu IV, DO at [facility] OR       ALLERGIES:    Allergies   Allergen Reactions    Seasonal          MEDICATIONS:    Current Outpatient Medications   Medication Sig Dispense Refill    Prenatal Vit-Fe Fumarate-FA (PRENATAL VITAMIN PO) Take 2 tablets by mouth daily \"Gummies\"      Loratadine (CLARITIN PO) Take 1 tablet by mouth as needed (seasonal allergy)      albuterol sulfate  (90 BASE) MCG/ACT inhaler Inhale 2 puffs into the lungs every 6 hours as needed for Wheezing      fluticasone (FLOVENT HFA) 110 MCG/ACT inhaler Inhale 1 puff into the lungs 2 times daily (Patient not taking: Reported on 4/3/2025)       No current facility-administered medications for this visit.        Social History     Socioeconomic History    Marital status: Single     Spouse name: None    Number of children: None

## 2025-05-01 ENCOUNTER — ROUTINE PRENATAL (OUTPATIENT)
Age: 25
End: 2025-05-01
Payer: MEDICAID

## 2025-05-01 VITALS
SYSTOLIC BLOOD PRESSURE: 120 MMHG | TEMPERATURE: 98.1 F | HEART RATE: 100 BPM | HEIGHT: 65 IN | DIASTOLIC BLOOD PRESSURE: 80 MMHG | BODY MASS INDEX: 22.49 KG/M2 | WEIGHT: 135 LBS | RESPIRATION RATE: 16 BRPM

## 2025-05-01 DIAGNOSIS — Z3A.21 21 WEEKS GESTATION OF PREGNANCY: ICD-10-CM

## 2025-05-01 DIAGNOSIS — Z34.90 SECOND PREGNANCY: ICD-10-CM

## 2025-05-01 DIAGNOSIS — Z14.8 CARRIER OF SPINAL MUSCULAR ATROPHY: Primary | ICD-10-CM

## 2025-05-01 PROCEDURE — 76811 OB US DETAILED SNGL FETUS: CPT | Performed by: OBSTETRICS & GYNECOLOGY

## 2025-05-01 PROCEDURE — 99212 OFFICE O/P EST SF 10 MIN: CPT | Performed by: OBSTETRICS & GYNECOLOGY

## 2025-05-01 NOTE — PROGRESS NOTES
[facility] MATERNAL FETAL MED  [address]  [place] [address]  Dept: [phone]     2025    RE:  Wander Urbina     : 2000   AGE: 25 y.o.     Dear Dr. Astudillo,    Thank you for allowing me to see Wander Urbina.  As I'm sure you will recall, Wander Urbina is a 25 y.o. [id number]'s last menstrual period was 2024 (exact date). Estimated Date of Delivery: 9/10/25 at 21w1d seen in our office today for the following:    REASON FOR VISIT: Level II and consultation for SMA carrier    Patient Active Problem List    Diagnosis Date Noted    Carrier of spinal muscular atrophy 2025    Second pregnancy 2025    21 weeks gestation of pregnancy 2025        PAST HISTORY:  OB History    Para Term  AB Living   1        SAB IAB Ectopic Molar Multiple Live Births              # Outcome Date GA Lbr Lance/2nd Weight Sex Type Anes PTL Lv   1 Current                   MEDICAL:  Past Medical History:   Diagnosis Date    Asthma     Fibroadenoma         SURGICAL:  Past Surgical History:   Procedure Laterality Date    BREAST FIBROADENOMA SURGERY      SKIN BIOPSY Left 2019    EXCISIONAL BIOPSY MASS THIGH performed by Goldy Liu IV, DO at [facility] OR       ALLERGIES:    Allergies   Allergen Reactions    Seasonal          MEDICATIONS:    Current Outpatient Medications   Medication Sig Dispense Refill    Prenatal Vit-Fe Fumarate-FA (PRENATAL VITAMIN PO) Take 2 tablets by mouth daily \"Gummies\"      Loratadine (CLARITIN PO) Take 1 tablet by mouth as needed (seasonal allergy)      fluticasone (FLOVENT HFA) 110 MCG/ACT inhaler Inhale 1 puff into the lungs 2 times daily      albuterol sulfate  (90 BASE) MCG/ACT inhaler Inhale 2 puffs into the lungs every 6 hours as needed for Wheezing       No current facility-administered medications for this visit.        Social History     Socioeconomic History    Marital

## 2025-07-23 ENCOUNTER — TELEPHONE (OUTPATIENT)
Age: 25
End: 2025-07-23

## 2025-07-23 DIAGNOSIS — O99.019 ANTEPARTUM ANEMIA: Primary | ICD-10-CM

## 2025-07-23 NOTE — TELEPHONE ENCOUNTER
Deirdre with [facility] left message to check on status of scheduling [facility]. We only received referral for Hematology. I called Deirdre back at [phone] ext [phone] and was transferred 3 times to [facility] and back to ext [phone] with not being able to speak to any one.

## (undated) DEVICE — GOWN,AURORA,NONRNF,XL,30/CS: Brand: MEDLINE

## (undated) DEVICE — GLOVE SURG SZ 6 THK91MIL LTX FREE SYN POLYISOPRENE ANTI

## (undated) DEVICE — GLOVE SURG SZ 65 THK91MIL LTX FREE SYN POLYISOPRENE

## (undated) DEVICE — SYRINGE IRRIG 60ML SFT PLIABLE BLB EZ TO GRP 1 HND USE W/

## (undated) DEVICE — ELECTRODE PT RET AD L9FT HI MOIST COND ADH HYDRGEL CORDED

## (undated) DEVICE — SKIN AFFIX SURG ADHESIVE 72/CS 0.55ML: Brand: MEDLINE

## (undated) DEVICE — STERILE POLYISOPRENE POWDER-FREE SURGICAL GLOVES WITH EMOLLIENT COATING: Brand: PROTEXIS

## (undated) DEVICE — GLOVE ORANGE PI 7 1/2   MSG9075

## (undated) DEVICE — STERILE POLYISOPRENE POWDER-FREE SURGICAL GLOVES: Brand: PROTEXIS

## (undated) DEVICE — SVMMC PEDS/UROLOGY MINOR PACK: Brand: MEDLINE INDUSTRIES, INC.